# Patient Record
Sex: FEMALE | Race: OTHER | Employment: OTHER | URBAN - METROPOLITAN AREA
[De-identification: names, ages, dates, MRNs, and addresses within clinical notes are randomized per-mention and may not be internally consistent; named-entity substitution may affect disease eponyms.]

---

## 2018-02-05 NOTE — PATIENT DISCUSSION
Cystoid Macular Edema Counseling:  I have explained to the patient at length the diagnosis of cystoid macular edema and its pathophysiology. I have discussed the various treatment options including medications and surgery. I stressed the importance of management of this condition in order to decrease the risk of permanent damage to the eye. Patient was advised to call if there is any decrease in vision or loss of contrast sensitivity or color vision. Return for follow-up as scheduled.

## 2018-02-05 NOTE — PATIENT DISCUSSION
COGAN'S INTERSTITIAL KERATITIS / UVEITIS OU:  BOTH EYES QUIET TODAY. CONTINUE ACTEMRA THERAPY WITH DR Abdulaziz Miranda. PATIENT IS OKAY FOR DR Abdulaziz Miranda TO TAPER PREDNISONE DOWN FROM 5 MG PO DAILY BY GRADUALLY DECREASING BY 1 MG DAILY PER MONTH. FOLLOW-UP WITH DR Colleen Palmer FOR DILATED EYE EXAM, REFER BACK TO DR AURELIA SALEH.

## 2018-02-05 NOTE — PATIENT DISCUSSION
RETINA IS ATTACHED OU:  NO HOLES OR TEARS SEEN ON DILATED EXAM TODAY.  RETINAL DETACHMENT SIGNS AND SYMPTOMS REVIEWED

## 2018-02-05 NOTE — PATIENT DISCUSSION
PLAQUENIL:  NO EVIDENCE OF RETINAL TOXICITY. CONTINUE ANNUAL SCREENING WITH DILATED EXAM, OCT MACULA, HVF 10-2 OU WITH DR Shi Garcia. RETURN TO DR AURELIA SALEH.

## 2018-02-05 NOTE — PATIENT DISCUSSION
NONPROLIFERATIVE DIABETIC RETINOPATHY: NO DME; RETURN FOR FOLLOW-UP WITH DR Jude Valinete FOR DILATED EYE EXAM.

## 2020-04-22 NOTE — PATIENT DISCUSSION
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 IF INFECTION IS COMPLETELY RESOLVED, THEN WE CAN RESUME ACTEMRA. IF DR Alexa Sewell PREFERS TO AVOID ACTEMRA (DUE TO PREVIOUS INFECTIONS), THEN CLEARED TO TRY HUMIRA INJECTIONS INSTEAD.

## 2020-04-22 NOTE — PATIENT DISCUSSION
New Prescription: prednisolone acetate (prednisolone acetate): drops,suspension: 1% 1 drop as directed into both eyes 04-

## 2020-04-22 NOTE — PATIENT DISCUSSION
COGAN'S INTERSTITIAL KERATITIS / UVEITIS OU:  RECURRENT INFLAMMATION OU. PRESCRIBE PREDNISOLONE Q1H OU AND CYCLOPENTOLATE (1%) 2X/DAY OU  (RX GIVEN). RETURN FOR FOLLOW UP AS SCHEDULED.

## 2020-04-27 NOTE — PATIENT DISCUSSION
COGAN'S INTERSTITIAL KERATITIS / UVEITIS OU:  DECREASE PREDNISOLONE Q2H OU FOR 4 DAYS; THEN Q4H OU (6X/DAY) UNTIL NEXT VISIT. STOP CYCLOPENTOLATE (1%) 2X/DAY OU. RETURN FOR FOLLOW UP AS SCHEDULED.

## 2020-04-27 NOTE — PATIENT DISCUSSION
PLAQUENIL: NO EVIDENCE OF RETINAL TOXICITY. CONTINUE ANNUAL SCREENING WITH DILATED EXAM AND OCT MACULA. COORDINATE ANNUAL HVF 10-2 OU WITH REFERRING PROVIDER.

## 2020-04-27 NOTE — PATIENT DISCUSSION
SYSTEMIC IMMUNOSUPPRESSION FOR AUTOIMMUNE KERATITIS/UVEITIS OU: ACTEMRA DISCONTINUED DUE TO BACTEREMIA. DR Whitney Garay HAS RESTART SAMPLE ACTEMRA INJECTIONS. IF SHE PREFERS TO AVOID ACTEMRA (DUE TO PREVIOUS INFECTIONS), THEN CLEARED TO TRY HUMIRA INJECTIONS INSTEAD.

## 2020-04-27 NOTE — PATIENT DISCUSSION
Continue: prednisolone acetate (prednisolone acetate): drops,suspension: 1% 1 drop as directed into both eyes 04-

## 2020-05-04 NOTE — PATIENT DISCUSSION
SYSTEMIC IMMUNOSUPPRESSION FOR AUTOIMMUNE KERATITIS/UVEITIS OU: ACTEMRA DISCONTINUED DUE TO BACTEREMIA. DR MCNAIR HAS RESUMED ACTEMRA INJECTIONS. FOLLOW-UP AS SCHEDULED.

## 2020-05-04 NOTE — PATIENT DISCUSSION
COGAN'S INTERSTITIAL KERATITIS / UVEITIS OU: CONTINUE PREDNISOLONE  Q4H OU AROUND THE CLOCK (6X/DAY) UNTIL NEXT VISIT. NO CYCLOPENTOLATE REQUIRED. RETURN FOR FOLLOW UP AS SCHEDULED.

## 2020-06-10 NOTE — PATIENT DISCUSSION
COGAN'S INTERSTITIAL KERATITIS / UVEITIS OU:   STABLE INFLAMMATION OU. STOP PRED FORTE. PRESCRIBED DUREZOL EVERY TWO HOURS WHILE AWAKE FOR THREE DAYS, AND CONTINUE 4X/DAY OU, UNTIL NEXT VISIT. NO CYCLOPENTOLATE REQUIRED. RETURN FOR FOLLOW UP AS SCHEDULED.

## 2020-06-10 NOTE — PATIENT DISCUSSION
New Prescription: Durezol (difluprednate): drops: 0.05% 1 drop every two hours while awake as directed into right eye 06-

## 2020-07-01 NOTE — PATIENT DISCUSSION
COGAN'S INTERSTITIAL KERATITIS / UVEITIS OU: INCREASED INFLAMMATION OU. START PREDNISONE 30 MG PO DAILY UNTIL NEXT VISIT. CONTINUE DUREZOL 4X/DAY OU. NO CYCLOPENTOLATE REQUIRED. RETURN FOR FOLLOW UP AS SCHEDULED.

## 2020-07-01 NOTE — PATIENT DISCUSSION
UVEITIS HAS NOT YET SHOWN RESPONSE TO HUMIRA. IF NO IMPROVEMENT OVER NEXT 2 MONTHS, THEN WILL DISCUSS ALTERNATIVE SYSTEMIC AGENTS WITH DR Ginger Waller. TODAY INCREASED PREDNISONE TO 30 MG PO DAILY.

## 2020-07-01 NOTE — PATIENT DISCUSSION
New Prescription: prednisone (prednisone): tablet: 10 mg 3 tablet once a day with meals by mouth 07-

## 2020-07-01 NOTE — PATIENT DISCUSSION
CYSTOID MACULAR EDEMA, OU:  ALL TX OPTIONS DISCUSSED. ADD PROLENSA 2X/DAY OU. SAMPLES GIVEN. RETURN FOR FOLLOW-UP AS SCHEDULED. None

## 2020-07-01 NOTE — PATIENT DISCUSSION
Continue: Durezol (difluprednate): drops: 0.05% 1 drop every two hours while awake as directed into right eye 06-

## 2020-07-15 NOTE — PATIENT DISCUSSION
RETINA IS ATTACHED OU:  NO VITRITIS; NO HOLES OR TEARS SEEN ON DILATED EXAM TODAY.  RETINAL DETACHMENT SIGNS AND SYMPTOMS REVIEWED

## 2020-07-15 NOTE — PATIENT DISCUSSION
COGAN'S INTERSTITIAL KERATITIS / UVEITIS OU:  DECREASED INFLAMMATION OU. DECREASE PREDNISONE TO 25 MG PO DAILY UNTIL NEXT VISIT. TAPER DUREZOL 3X/DAY OU FOR 2 WEEKS, THEN 2X/DAY OU UNTIL NEXT VISIT. RETURN AS SCHEDULED.

## 2020-07-15 NOTE — PATIENT DISCUSSION
DR MCNAIR HAS SWITCHED HER FROM [facility] BACK TO [facility].

## 2020-07-29 NOTE — PATIENT DISCUSSION
DR MCNAIR HAS SWITCHED HER FROM [facility] BACK TO [facility].

## 2020-07-29 NOTE — PATIENT DISCUSSION
COGAN'S INTERSTITIAL KERATITIS / UVEITIS OU:  STOP PROLENSA. DECREASE PREDNISONE TO 20 MG PO DAILY UNTIL NEXT VISIT. INCREASE TO DUREZOL 3X/DAY OU UNTIL NEXT VISIT. RETURN AS SCHEDULED.

## 2020-08-26 NOTE — PATIENT DISCUSSION
TOLERATING WELL TODAY. CONTINUE PREDNISONE TO 20 MG PO DAILY UNTIL NEXT VISIT; THEN EVALUATE TO START TAPERING AT NEXT VISIT.

## 2020-08-26 NOTE — PATIENT DISCUSSION
COGAN'S INTERSTITIAL KERATITIS / UVEITIS OU: CONTINUE PREDNISONE TO 20 MG PO DAILY UNTIL NEXT VISIT. CONTINUE DUREZOL 2X/DAY OU UNTIL NEXT VISIT. RETURN AS SCHEDULED.

## 2020-09-23 NOTE — PATIENT DISCUSSION
COGAN'S INTERSTITIAL KERATITIS / UVEITIS OU: BOTH EYES ESSENTIALLY QUIET ON EXAM TODAY. TAPER PREDNISONE 15 MG PO DAILY FOR 4 WEEKS; THEN PREDNISONE 10 MG PO DAILY FOR 4 WEEKS; THEN PREDNISONE 5 MG PO DAILY UNTIL NEXT VISIT. CONTINUE DUREZOL 2X/DAY OU UNTIL NEXT VISIT. RETURN AS SCHEDULED.

## 2020-12-07 NOTE — PATIENT DISCUSSION
HYDROXYCHLOROQUINE (PLAQUENIL) USE:  NO OCULAR TOXICITY OU. OCT WNL 9/20 WITH DR. Franck Kiser. CONTINUE PLAQUENIL AS DIRECTED. SCHEDULE YEARLY VF 10-2 RED,OCT MACULA, AND ASSESMENT OF COLOR PLATES. KEEP FOLLOW UP AS SCHEDULED- next available 10-2/color plates.

## 2020-12-07 NOTE — PATIENT DISCUSSION
COGAN'S INTERSTITIAL KERATITIS / UVEITIS OU: BOTH EYES ESSENTIALLY QUIET ON EXAM TODAY. CONTINUE PRED TAPER AS DIRECTED BY DR. Cyndi Sanchez (CURRENTLY ON 10mg QD) AND DUREZOL 2X/DAY OU UNTIL NEXT VISIT. RETURN AS SCHEDULED.

## 2020-12-07 NOTE — PATIENT DISCUSSION
MILD NONPROLIFERATIVE DIABETIC RETINOPATHY OS, NO RETINOPATHY OD: PATIENT INSTRUCTED TO RETURN TO PCP FOR CONTINUED BLOOD SUGAR MONITORING AND RETURN FOR FOLLOW-UP AS SCHEDULED FOR DILATED FUNDUS EXAM.SEES DR. MOSES- DYLLAN AT THE END OF THE MONTH

## 2020-12-30 NOTE — PATIENT DISCUSSION
DECREASE PREDNISONE DOWN TO 5 MG PO DAILY, UNTIL NEXT VISIT. INCREASE DUREZOL 4X/DAY OU UNTIL NEXT VISIT. RETURN AS SCHEDULED.

## 2020-12-30 NOTE — PATIENT DISCUSSION
COGAN'S INTERSTITIAL KERATITIS / UVEITIS OU:  DISCUSSED LOWER RISK OF SYSTEMIC ADVERSE EVENTS WITH DUREZOL THAN PREDNISONE.

## 2021-01-27 NOTE — PATIENT DISCUSSION
Continue: Durezol (difluprednate): drops: 0.05% 1 drop four times a day as directed into right eye 06-

## 2021-01-27 NOTE — PATIENT DISCUSSION
RHEUMATOLOGY:  CONTINUE ACTEMRA INJECTIONS WEEKLY. CONTINUE PLAQUENIL 200 MG PO BID. RETURN AS SCHEDULED TO DR Mel Novak.

## 2021-01-27 NOTE — PATIENT DISCUSSION
ANTERIOR UVEITIS OU:  BOTH EYES QUIET AND IOP WITHIN NORMAL LIMITS. CONTINUE DUREZOL 4X/DAY OU UNTIL NEXT VISIT. CONTINUE PREDNISONE 5 MG PO DAILY. RETURN AS SCHEDULED.

## 2021-03-08 NOTE — PATIENT DISCUSSION
Add [id number] Cpt? (Important Note: In 2017 The Use Of 29213 Is Being Tracked By Cms To Determine Future Global Period Reimbursement For Global Periods): yes Uveitis Counseling: I have explained the diagnosis and pathophysiology of uveitis. In isolated circumstances this is usually idiopathic, however recurrent intraocular inflammation may be associated with systemic disease and medical and laboratory workup may be necessary in conjunction with the primary care doctor. I have stressed compliance with medications and follow-up. Detail Level: Detailed

## 2021-06-16 NOTE — PATIENT DISCUSSION
ANTERIOR UVEITIS OU:  BOTH EYES QUIET AND IOP WITHIN NORMAL LIMITS TODAY. CONTINUE DUREZOL 3X/DAY OU (REFILLS GIVEN). NO INDICATION FOR PREDNISONE TODAY. NO IMMEDIATE NEED TO RESTART ACTEMRA YET. RETURN AS SCHEDULED.

## 2021-06-16 NOTE — PATIENT DISCUSSION
LETTER TO DR Ginger Waller:  OK TO WAIT TO RESTART ACTEMRA WHEN CLEARED BY [facility] AFTER LAST EPISODE OF OSTEOMYELITIS AND TOE AMPUTATION.

## 2021-06-16 NOTE — PATIENT DISCUSSION
PLAQUENIL:  OK TO RESTART PLAQUENIL FOR RHEUMATOID ARTHRITIS. NO EVIDENCE OF RETINAL TOXICITY. CONTINUE ANNUAL SCREENING WITH DILATED EXAM AND OCT MACULA. [address]

## 2021-09-01 NOTE — PATIENT DISCUSSION
PLAQUENIL: NO EVIDENCE OF RETINAL TOXICITY. CONTINUE ANNUAL SCREENING WITH DILATED EXAM AND OCT MACULA. COORDINATE ANNUAL HVF 10-2 OU WITH DR COHEN.

## 2021-09-01 NOTE — PATIENT DISCUSSION
ANTERIOR UVEITIS OU: BOTH EYES QUIET AND IOP WITHIN NORMAL LIMITS TODAY. CONTINUE DUREZOL 3X/DAY OU. CONTINUE ACTEMRA FOR RHEUMATOID ARTHRITIS. RETURN AS SCHEDULED.

## 2022-01-11 NOTE — PATIENT DISCUSSION
Discussed potential cataract surgery in the near future. Discussed seeing Dr. Avery Church as scheduled in March and then scheduling a cataract surgery PRE-OP if he approves. She is going to try +2.50 OTC readers until surgery. Do not advise updating glasses at this time.

## 2022-02-24 NOTE — PATIENT DISCUSSION
Iritis Counseling: I have explained the diagnosis and pathophysiology acute iritis. In isolated circumstances this is usually idiopathic, however recurrent intraocular inflammation may be associated with systemic disease and medical and laboratory workup may be necessary in conjunction with the patient?s primary care doctor. I have stressed compliance with drops and follow-up. Order was written/H&P was completed/Contractions pattern was reviewed/FHR was reviewed/Induction / Augmentation was discussed

## 2022-03-08 ENCOUNTER — NEW PATIENT (OUTPATIENT)
Dept: URBAN - METROPOLITAN AREA CLINIC 47 | Facility: CLINIC | Age: 62
End: 2022-03-08

## 2022-03-08 DIAGNOSIS — E11.9: ICD-10-CM

## 2022-03-08 DIAGNOSIS — H01.02A: ICD-10-CM

## 2022-03-08 DIAGNOSIS — H52.7: ICD-10-CM

## 2022-03-08 DIAGNOSIS — H11.051: ICD-10-CM

## 2022-03-08 DIAGNOSIS — H01.02B: ICD-10-CM

## 2022-03-08 PROCEDURE — 92015 DETERMINE REFRACTIVE STATE: CPT

## 2022-03-08 PROCEDURE — 92004 COMPRE OPH EXAM NEW PT 1/>: CPT

## 2022-03-08 ASSESSMENT — VISUAL ACUITY
OD_SC: 20/25+2
OS_SC: 20/20-1
OD_SC: J7
OS_SC: J10

## 2022-03-08 ASSESSMENT — TONOMETRY
OD_IOP_MMHG: 16
OS_IOP_MMHG: 17

## 2022-05-24 NOTE — PATIENT DISCUSSION
CYSTOID MACULAR EDEMA, OU:  RESOLVED OU. DECREASE PROLENSA TO 1X/DAY OU. SAMPLES GIVEN. RETURN FOR FOLLOW-UP AS SCHEDULED. Admission

## 2022-06-01 NOTE — PATIENT DISCUSSION
Must use Durezol as the postoperative steroid.  Do not taper down to less than TID OU after surgery.

## 2022-06-22 NOTE — PATIENT DISCUSSION
The patient feels that the cataract is significantly impacting daily activities and has elected cataract surgery. The risks, benefits, and alternatives to surgery were discussed.  The patient elects to proceed with surgery OD first then OS 4 weeks later if symptoms persist.

## 2022-06-22 NOTE — PATIENT DISCUSSION
Discussed the risks and benefits of having the astigmatism correction but not recommended. Patient agrees with this plan and will Proceed with Basic Testing.

## 2022-06-22 NOTE — PATIENT DISCUSSION
Will use Durezol starting 2 days prior to surgery 5 times a day and continue for 4 weeks, then taper to TID.

## 2022-06-28 NOTE — PATIENT DISCUSSION
Will increase Durezol to 5 times a day in surgery eye only starting 2 days prior to surgery and continue for 4 weeks, then taper to TID.

## 2022-06-28 NOTE — PATIENT DISCUSSION
I have prescribed Moxifloxacin, Prolensa and Durezol for use as directed before and after cataract surgery.

## 2022-07-06 NOTE — PATIENT DISCUSSION
Cataract surgery has been performed in the first Eye and activities of daily living are still impaired The patient would like to proceed with cataract surgery in the second eye as scheduled. The option to not proceed with the second eye has been discussed, but the patient would like to proceed. Schedule phaco with IOL OS.

## 2022-07-06 NOTE — PATIENT DISCUSSION
Post-Op Instructions: The patient was instructed in the proper use of post-operative eye drops: antibiotic eye drop in the surgical eye 3 times per day for 7 days, then discontinue; NSAID eye drop once per day and continue as directed; steroid eye drop one drop as directed (see above). Call back instructions, retinal detachment and endophthalmitis precautions given.

## 2022-07-15 NOTE — PATIENT DISCUSSION
Continue with post-operative drops until additional instructions provided by Dr. Ezequiel Merino. (Durezol 5 times a day in surgery eye for 4 weeks, then taper to QID  for 2 weeks, then TID).

## 2022-08-05 ENCOUNTER — EMERGENCY VISIT (OUTPATIENT)
Dept: URBAN - METROPOLITAN AREA CLINIC 47 | Facility: CLINIC | Age: 62
End: 2022-08-05

## 2022-08-05 DIAGNOSIS — H53.10: ICD-10-CM

## 2022-08-05 DIAGNOSIS — E11.9: ICD-10-CM

## 2022-08-05 PROCEDURE — 92134 CPTRZ OPH DX IMG PST SGM RTA: CPT

## 2022-08-05 PROCEDURE — 92012 INTRM OPH EXAM EST PATIENT: CPT

## 2022-08-05 ASSESSMENT — VISUAL ACUITY
OD_SC: 20/80
OS_SC: 20/80-1

## 2022-08-05 ASSESSMENT — TONOMETRY
OD_IOP_MMHG: 19
OS_IOP_MMHG: 23

## 2022-08-05 NOTE — PATIENT DISCUSSION
Significant refractive shift from uncontrolled diabetes. No new rx at this time. Suggest +2.50 OTC temporarily.

## 2022-08-10 NOTE — PATIENT DISCUSSION
Continue with post-operative drops until additional instructions provided by Dr. Asif Wilson. (Durezol 5 times a day in surgery eye for 4 weeks, then taper to QID  for 2 weeks, then TID).

## 2022-08-25 ENCOUNTER — CONTACT LENSES/GLASSES VISIT (OUTPATIENT)
Dept: URBAN - METROPOLITAN AREA CLINIC 47 | Facility: CLINIC | Age: 62
End: 2022-08-25

## 2022-08-25 DIAGNOSIS — E11.9: ICD-10-CM

## 2022-08-25 DIAGNOSIS — H11.051: ICD-10-CM

## 2022-08-25 PROCEDURE — 99212 OFFICE O/P EST SF 10 MIN: CPT

## 2022-08-25 ASSESSMENT — VISUAL ACUITY
OD_SC: 20/30
OU_SC: 20/30
OS_SC: 20/40

## 2022-08-25 ASSESSMENT — TONOMETRY
OS_IOP_MMHG: 21
OD_IOP_MMHG: 17

## 2022-08-29 NOTE — PATIENT DISCUSSION
Continue with post-operative drops until additional instructions provided by Dr. Ciera Aggarwal. (OD Durezol TID, Prolensa QD. OS Durezol 5 times a day for 4 weeks, then taper to QID for 2 weeks, then TID until further instruction; Prolensa QD).

## 2022-11-02 NOTE — PATIENT DISCUSSION
Patient is requesting a Refill:    dilTIAZem (CARDIZEM CD) 240 MG 24 hr capsule    Pharmacy    [facility] #[id number] - [facility] [address]  & [address] [place] [address]   Phone:  [phone]  Fax:  [phone]   ELIANA #:  [id number]        Retinal exam findings communicated to Physician managing diabetes.

## 2023-01-09 NOTE — PATIENT DISCUSSION
HVF 10-2 today WNL OU. No evidence of toxicity at this time. Ok to continue Plaquenil as prescribed. Follow-up as directed. Monitor.

## 2023-09-25 ENCOUNTER — COMPREHENSIVE EXAM (OUTPATIENT)
Dept: URBAN - METROPOLITAN AREA CLINIC 47 | Facility: CLINIC | Age: 63
End: 2023-09-25

## 2023-09-25 DIAGNOSIS — H52.4: ICD-10-CM

## 2023-09-25 DIAGNOSIS — H01.02B: ICD-10-CM

## 2023-09-25 DIAGNOSIS — H01.02A: ICD-10-CM

## 2023-09-25 DIAGNOSIS — H52.03: ICD-10-CM

## 2023-09-25 DIAGNOSIS — H40.023: ICD-10-CM

## 2023-09-25 DIAGNOSIS — E11.9: ICD-10-CM

## 2023-09-25 DIAGNOSIS — H11.051: ICD-10-CM

## 2023-09-25 DIAGNOSIS — H40.053: ICD-10-CM

## 2023-09-25 PROCEDURE — 92014 COMPRE OPH EXAM EST PT 1/>: CPT

## 2023-09-25 PROCEDURE — 92015 DETERMINE REFRACTIVE STATE: CPT

## 2023-09-25 ASSESSMENT — VISUAL ACUITY
OS_SC: J12
OS_SC: 20/25-2
OU_SC: J12
OD_SC: <J12
OD_SC: 20/50

## 2023-09-25 ASSESSMENT — TONOMETRY
OS_IOP_MMHG: 23
OD_IOP_MMHG: 20

## 2023-10-31 ENCOUNTER — FOLLOW UP (OUTPATIENT)
Dept: URBAN - METROPOLITAN AREA CLINIC 47 | Facility: CLINIC | Age: 63
End: 2023-10-31

## 2023-10-31 DIAGNOSIS — H40.053: ICD-10-CM

## 2023-10-31 DIAGNOSIS — H40.023: ICD-10-CM

## 2023-10-31 PROCEDURE — 76514 ECHO EXAM OF EYE THICKNESS: CPT

## 2023-10-31 PROCEDURE — 99213 OFFICE O/P EST LOW 20 MIN: CPT

## 2023-10-31 PROCEDURE — 92133 CPTRZD OPH DX IMG PST SGM ON: CPT

## 2023-10-31 PROCEDURE — 92083 EXTENDED VISUAL FIELD XM: CPT

## 2023-10-31 ASSESSMENT — TONOMETRY
OD_IOP_MMHG: 18
OS_IOP_MMHG: 22

## 2023-10-31 ASSESSMENT — VISUAL ACUITY
OS_SC: 20/25
OU_SC: 20/20
OD_SC: 20/30

## 2023-11-15 ENCOUNTER — TECH ONLY (OUTPATIENT)
Dept: URBAN - METROPOLITAN AREA CLINIC 47 | Facility: CLINIC | Age: 63
End: 2023-11-15

## 2023-11-15 DIAGNOSIS — H40.053: ICD-10-CM

## 2023-11-15 DIAGNOSIS — H40.023: ICD-10-CM

## 2023-11-15 PROCEDURE — 92083 EXTENDED VISUAL FIELD XM: CPT

## 2023-11-15 PROCEDURE — 99211T TECH SERVICE
